# Patient Record
Sex: FEMALE | Race: WHITE | NOT HISPANIC OR LATINO | Employment: FULL TIME | URBAN - METROPOLITAN AREA
[De-identification: names, ages, dates, MRNs, and addresses within clinical notes are randomized per-mention and may not be internally consistent; named-entity substitution may affect disease eponyms.]

---

## 2024-09-10 ENCOUNTER — OFFICE VISIT (OUTPATIENT)
Dept: BEHAVIORAL HEALTH | Facility: CLINIC | Age: 49
End: 2024-09-10
Payer: COMMERCIAL

## 2024-09-10 DIAGNOSIS — F41.1 GENERALIZED ANXIETY DISORDER: Chronic | ICD-10-CM

## 2024-09-10 DIAGNOSIS — F33.42 RECURRENT MAJOR DEPRESSIVE DISORDER, IN FULL REMISSION (HCC): ICD-10-CM

## 2024-09-10 PROCEDURE — 99214 OFFICE O/P EST MOD 30 MIN: CPT | Performed by: PSYCHIATRY & NEUROLOGY

## 2024-09-10 NOTE — PROGRESS NOTES
PSYCHIATRY FOLLOW-UP NOTE      Name: Yomaira Rich  MRN: [id number]  : 1975  Age: 49 y.o.  Date of assessment: 9/10/2024  PCP: Parker Newton M.D.  Persons in attendance: Patient      REASON FOR VISIT/CHIEF COMPLAINT (as stated by Patient):  Yomaira Rich is a 49 y.o.,  or   White female, attending follow-up appointment for mood and anxiety management.      HISTORY OF PRESENT ILLNESS:  Yomaira Rich is a 49 y.o. old female with MDD, YINKA and OCD comes in today for follow up. Patient was last seen 2 months ago, and following treatment planning recommendations were done:  Increase Zoloft 250 mg daily for mood and anxiety management.     Continue Inderal 20 mg BID PRN for anxiety management.   Increase Trazodone 150 mg HS PRN for sleep.  Currently not in psychotherapy for mood & anxiety.    History of Present Illness    She is compliant with increasing Zoloft and trazodone with good response for mood, anxiety and sleep and denies any signs of serotonin syndrome.  She takes propranolol 20 mg twice daily consistently with second dose at bedtime.  Trazodone and gabapentin.  She does report occasional episodes of dizziness at the middle of the night.  Discussed that these are likely the additive hypotensive effect of taking 3 medications together.  She is currently on gabapentin 300 mg at bedtime.  Agreed with taking propranolol and 100 mg of gabapentin at dinnertime and taking the another 200 mg of gabapentin at bedtime with trazodone 150 mg.    Educated to monitor for signs of serotonin syndrome.  Agreed with plan of reducing Zoloft onto 200 mg after 6 months of stability.  Patient is showing improvement in mood and anxiety for the last 1 month.  Patient in agreement with reducing Zoloft to 200 mg early next year.      CURRENT MEDICATIONS:  Current Outpatient Medications   Medication Sig Dispense Refill    sertraline (ZOLOFT) 100 MG Tab Take 2.5 Tablets by mouth every  day for 90 days. 225 Tablet 0    propranolol (INDERAL) 20 MG Tab Take 1 Tablet by mouth 3 times a day as needed (Anxiety). 270 Tablet 1    traZODone (DESYREL) 150 MG Tab Take 1 Tablet by mouth every evening. 90 Tablet 1    Potassium 99 MG Tab Take 99 mg by mouth every day.      acetaminophen (TYLENOL) 500 MG Tab Take 1,000 mg by mouth every 6 hours as needed for Moderate Pain.      ibuprofen (MOTRIN) 200 MG Tab Take 200 mg by mouth every 8 hours as needed for Headache.      Triamcinolone Acetonide (KENALOG INJ) Inject 1 Dose as directed every 3 months.      atorvastatin (LIPITOR) 20 MG Tab Take 1 Tablet by mouth every day. 90 Tablet 3    hydroCHLOROthiazide 25 MG Tab Take 1 Tablet by mouth every day. (Patient taking differently: Take 25 mg by mouth every day.) 90 Tablet 3    levothyroxine (SYNTHROID) 50 MCG Tab Take 1 Tablet by mouth every morning on an empty stomach. 90 Tablet 3    NALTREXONE HCL PO Take 4.5 mg by mouth every day. COMPOUNDED MEDICATION 4.5 MG, ONCE DAILY  [facility]      Multiple Vitamin (MULTIVITAMIN PO) Take 1 Tablet by mouth every day.      DHEA 25 MG Tab Take 1 Tablet by mouth every day.      spironolactone (ALDACTONE) 100 MG Tab Take 100 mg by mouth every morning.      gabapentin (NEURONTIN) 100 MG Cap Take 400 mg by mouth every evening.  1     No current facility-administered medications for this visit.       MEDICAL HISTORY  Past Medical History:   Diagnosis Date    Arthritis 2018    Osteo    Bowel habit changes     constipation    Breath shortness     25 years ago    Bronchitis 11/2016    Diabetes (HCC) 12/08/2023    Borderline per patient    Disorder of thyroid     Erythrocytosis     Fibromyalgia     Gynecological disorder     pre cancer-vulva    High cholesterol     Hypertension     well controlled on medication per pt    Pain     Back, neck and shoulders.    Psychiatric problem     anxiety, depression    Under care of pain management specialist     MD Meeks in [place]      Past Surgical History:   Procedure Laterality Date    BIOPSY, VULVA, VAGINA, OR PERINEUM  4/18/2024    Procedure: WIDE LOCAL EXCISION OF LEFT VULVA;  Surgeon: Scott Blanco M.D.;  Location: [facility];  Service: Gynecology Oncology    ND CYSTOURETHROSCOPY N/A 12/26/2023    Procedure: CYSTOSCOPY;  Surgeon: Misty Kapoor D.O.;  Location: [facility];  Service: Gyn Robotic    HYSTERECTOMY ROBOTIC XI N/A 12/26/2023    Procedure: ROBOTIC HYSTERECTOMY, BILATERAL SALPINGO-OOPHORECTOMY;  Surgeon: Misty Kapoor D.O.;  Location: [facility];  Service: Gyn Robotic    DILATION AND CURETTAGE  05/19/2022    Procedure: DILATION AND CURETTAGE-ENDOCERVICAL;  Surgeon: Scott Blanco M.D.;  Location: [facility];  Service: Gynecology Oncology    BREAST BIOPSY Right 08/07/2019    Procedure: BIOPSY, BREAST - ASHLEY LOCALIZED;  Surgeon: Perla Mancilla M.D.;  Location: SURGERY SAME DAY [facility];  Service: General    MARY BY LAPAROSCOPY N/A 05/18/2018    Procedure: MARY BY LAPAROSCOPY;  Surgeon: Perla Mancilla M.D.;  Location: SURGERY SAME DAY [facility];  Service: General    PELVIC EXAM UNDER ANESTHESIA  06/01/2017    Procedure: PELVIC EXAM UNDER ANESTHESIA;  Surgeon: Jose Bruner M.D.;  Location: [facility];  Service:     VULVECTOMY PARTIAL  06/01/2017    Procedure: VULVECTOMY PARTIAL/WIDE LOCAL EXCISION;  Surgeon: Jose Bruner M.D.;  Location: [facility];  Service:     OTHER  2002    wisdom teeth removed    US-NEEDLE CORE BX-BREAST PANEL  1993    OTHER ABDOMINAL SURGERY  2019    Gall bladder removal       PAST PSYCHIATRIC MEDICATIONS  Prozac  Lexapro 20 mg daily for 3 years: Initially helpful but stopped working afterwards  Zoloft   Effexor for pain management (was not depressed during this trial)  Wellbutrin (for pain management per patient & not for depression)  Buspirone: GI side effects  Xanax 0.25 mg PRN      REVIEW OF SYSTEMS:         Constitutional negative   Eyes negative   Ears/Nose/Mouth/Throat negative   Cardiovascular negative   Respiratory negative   Gastrointestinal negative   Genitourinary negative   Muscular negative   Integumentary negative   Neurological negative   Endocrine negative   Hematologic/Lymphatic negative     PHYSICAL EXAMINAION:  Vital signs: LMP 2021 (Approximate)   Musculoskeletal: Normal gait.   Abnormal movements: none      MENTAL STATUS EXAMINATION      General:   - Grooming and hygiene: Casual,   - Apparent distress: none,   - Behavior: Calm  - Eye Contact:  Good,   - no psychomotor agitation or retardation    - Participation: Active verbal participation  Orientation: Alert and Fully Oriented to person, place and time  Mood: Euthymic  Affect: Flexible and Full range,  Thought Process: Logical and Goal-directed  Thought Content: Denies suicidal or homicidal ideations, intent or plan   Perception: Denies auditory or visual hallucinations. No delusions noted   Attention span and concentration: Intact   Speech:Rate within normal limits and Volume within normal limits  Language: Appropriate   Insight: Good  Judgment: Good  Recent and remote memory: No gross evidence of memory deficits        DEPRESSION SCREENIN/27/2023    10:00 AM 1/10/2024     8:00 AM 7/10/2024     8:30 AM   Depression Screen (PHQ-2/PHQ-9)   PHQ-2 Total Score 6 2 4   PHQ-9 Total Score 18 5 14       Interpretation of PHQ-9 Total Score   Score Severity   1-4 No Depression   5-9 Mild Depression   10-14 Moderate Depression   15-19 Moderately Severe Depression   20-27 Severe Depression    CURRENT RISK:       Suicidal: Low       Homicidal: Low       Self-Harm: Low       Relapse: Low       Crisis Safety Plan Reviewed Not Indicated       If evidence of imminent risk is present, intervention/plan:      MEDICAL RECORDS/LABS/DIAGNOSTIC TESTS REVIEWED:  No new lab since last visit   Results        [facility] records -   Reviewed     ASSESSMENT &  PLAN:  Assessment & Plan        PLAN:  (1) MDD, (2) YINKA, (3) OCD   improving  Continue Zoloft 250 mg daily for mood and anxiety management.     Continue Inderal 20 mg BID PRN for anxiety management.   Continue Trazodone 150 mg HS PRN for sleep.  Currently not in psychotherapy for mood & anxiety.  Medication options, alternatives (including no medications) and medication risks/benefits/side effects were discussed in detail.  Explained importance of contraceptive measures while on psychotropic medications, educated to let provider know if ever pregnant or wanting to become pregnant. Verbalized understanding.  The patient was advised to call, message provider on [employer], or come in to the clinic if symptoms worsen or if any future questions/issues regarding their medications arise; the patient verbalized understanding and agreement.    The patient was educated to call 911, call the suicide hotline, or go to local ER if having thoughts of suicide or homicide; verbalized understanding.      Billing Coding based on:  22588 based on MDM    Return to clinic in 3-4 months or sooner if symptoms worsen.  Next Appointment: instruction provided on how to make the next appointment.     The proposed treatment plan was discussed with the patient who was provided the opportunity to ask questions and make suggestions regarding alternative treatment. Patient verbalized understanding and expressed agreement with the plan.       Armando Choi M.D.  09/10/24    This note was created using voice recognition software (Dragon). The accuracy of the dictation is limited by the abilities of the software. I have reviewed the note prior to signing, however some errors in grammar and context are still possible. If you have any questions related to this note please do not hesitate to contact our office.

## 2024-10-10 DIAGNOSIS — F41.1 GENERALIZED ANXIETY DISORDER: Chronic | ICD-10-CM

## 2024-10-10 DIAGNOSIS — F42.2 MIXED OBSESSIONAL THOUGHTS AND ACTS: ICD-10-CM

## 2024-10-10 DIAGNOSIS — F33.42 RECURRENT MAJOR DEPRESSIVE DISORDER, IN FULL REMISSION (HCC): ICD-10-CM

## 2024-10-10 RX ORDER — SERTRALINE HYDROCHLORIDE 100 MG/1
250 TABLET, FILM COATED ORAL DAILY
Qty: 225 TABLET | Refills: 1 | Status: SHIPPED | OUTPATIENT
Start: 2024-10-10 | End: 2025-01-08

## 2024-12-02 ENCOUNTER — PATIENT MESSAGE (OUTPATIENT)
Dept: MEDICAL GROUP | Facility: PHYSICIAN GROUP | Age: 49
End: 2024-12-02
Payer: COMMERCIAL

## 2024-12-03 DIAGNOSIS — E03.9 HYPOTHYROIDISM, UNSPECIFIED TYPE: Chronic | ICD-10-CM

## 2024-12-03 RX ORDER — ATORVASTATIN CALCIUM 20 MG/1
20 TABLET, FILM COATED ORAL DAILY
Qty: 90 TABLET | Refills: 0 | Status: SHIPPED | OUTPATIENT
Start: 2024-12-03

## 2024-12-03 RX ORDER — HYDROCHLOROTHIAZIDE 25 MG/1
25 TABLET ORAL
Qty: 90 TABLET | Refills: 0 | Status: SHIPPED | OUTPATIENT
Start: 2024-12-03

## 2024-12-03 RX ORDER — LEVOTHYROXINE SODIUM 50 UG/1
50 TABLET ORAL
Qty: 90 TABLET | Refills: 0 | Status: SHIPPED | OUTPATIENT
Start: 2024-12-03

## 2024-12-03 NOTE — TELEPHONE ENCOUNTER
Received request via: Patient    Was the patient seen in the last year in this department? No    Does the patient have an active prescription (recently filled or refills available) for medication(s) requested? No        Does the patient have [facility] and need 100-day supply? (This applies to ALL medications) Patient does not have [facility]    Appointment 1/15/2025

## 2024-12-04 ENCOUNTER — APPOINTMENT (OUTPATIENT)
Dept: URBAN - METROPOLITAN AREA CLINIC 4 | Facility: CLINIC | Age: 49
Setting detail: DERMATOLOGY
End: 2024-12-04

## 2024-12-04 DIAGNOSIS — L82.0 INFLAMED SEBORRHEIC KERATOSIS: ICD-10-CM

## 2024-12-04 DIAGNOSIS — L81.4 OTHER MELANIN HYPERPIGMENTATION: ICD-10-CM

## 2024-12-04 DIAGNOSIS — L82.1 OTHER SEBORRHEIC KERATOSIS: ICD-10-CM

## 2024-12-04 DIAGNOSIS — L70.0 ACNE VULGARIS: ICD-10-CM | Status: IMPROVED

## 2024-12-04 DIAGNOSIS — D22 MELANOCYTIC NEVI: ICD-10-CM | Status: STABLE

## 2024-12-04 PROBLEM — D22.62 MELANOCYTIC NEVI OF LEFT UPPER LIMB, INCLUDING SHOULDER: Status: ACTIVE | Noted: 2024-12-04

## 2024-12-04 PROBLEM — D22.5 MELANOCYTIC NEVI OF TRUNK: Status: ACTIVE | Noted: 2024-12-04

## 2024-12-04 PROBLEM — D22.71 MELANOCYTIC NEVI OF RIGHT LOWER LIMB, INCLUDING HIP: Status: ACTIVE | Noted: 2024-12-04

## 2024-12-04 PROBLEM — D22.39 MELANOCYTIC NEVI OF OTHER PARTS OF FACE: Status: ACTIVE | Noted: 2024-12-04

## 2024-12-04 PROCEDURE — ? COUNSELING

## 2024-12-04 PROCEDURE — 99213 OFFICE O/P EST LOW 20 MIN: CPT | Mod: 25

## 2024-12-04 PROCEDURE — ? OBSERVATION AND MEASURE

## 2024-12-04 PROCEDURE — 17110 DESTRUCTION B9 LES UP TO 14: CPT

## 2024-12-04 PROCEDURE — ? LIQUID NITROGEN

## 2024-12-04 PROCEDURE — ? ADDITIONAL NOTES

## 2024-12-04 PROCEDURE — ? PRESCRIPTION

## 2024-12-04 PROCEDURE — ? TREATMENT REGIMEN

## 2024-12-04 RX ORDER — SPIRONOLACTONE 100 MG/1
1 TABLET, FILM COATED ORAL QD
Qty: 90 | Refills: 3 | Status: ERX

## 2024-12-04 ASSESSMENT — LOCATION ZONE DERM
LOCATION ZONE: FACE
LOCATION ZONE: LEG
LOCATION ZONE: ARM
LOCATION ZONE: SCALP
LOCATION ZONE: TRUNK

## 2024-12-04 ASSESSMENT — LOCATION DETAILED DESCRIPTION DERM
LOCATION DETAILED: LEFT LATERAL FOREHEAD
LOCATION DETAILED: LEFT SUPERIOR CENTRAL MALAR CHEEK
LOCATION DETAILED: RIGHT POPLITEAL SKIN
LOCATION DETAILED: RIGHT MEDIAL BREAST 3-4:00 REGION
LOCATION DETAILED: LEFT PROXIMAL DORSAL FOREARM
LOCATION DETAILED: RIGHT RIB CAGE
LOCATION DETAILED: RIGHT CHIN
LOCATION DETAILED: LEFT INFERIOR LATERAL MIDBACK
LOCATION DETAILED: LEFT INFERIOR MEDIAL MIDBACK
LOCATION DETAILED: RIGHT SUPERIOR UPPER BACK
LOCATION DETAILED: MID-FRONTAL SCALP
LOCATION DETAILED: LEFT INFERIOR LATERAL LOWER BACK
LOCATION DETAILED: RIGHT INFERIOR LATERAL UPPER BACK
LOCATION DETAILED: RIGHT MEDIAL UPPER BACK

## 2024-12-04 ASSESSMENT — LOCATION SIMPLE DESCRIPTION DERM
LOCATION SIMPLE: RIGHT UPPER BACK
LOCATION SIMPLE: LEFT CHEEK
LOCATION SIMPLE: RIGHT BREAST
LOCATION SIMPLE: ABDOMEN
LOCATION SIMPLE: LEFT FOREARM
LOCATION SIMPLE: RIGHT POPLITEAL SKIN
LOCATION SIMPLE: LEFT FOREHEAD
LOCATION SIMPLE: ANTERIOR SCALP
LOCATION SIMPLE: CHIN
LOCATION SIMPLE: LEFT LOWER BACK

## 2024-12-04 NOTE — PROCEDURE: OBSERVATION
Size Of Lesion: 4mm
Detail Level: Detailed
Morphology Per Location (Optional): centrally dark papule
Size Of Lesion: 3mm
Morphology Per Location (Optional): dark macule
Morphology Per Location (Optional): heart-shaped macule
Size Of Lesion: 5mm
Morphology Per Location (Optional): dark papule in scar
Size Of Lesion: 2mm
Size Of Lesion: 1mm
Morphology Per Location (Optional): reddish brown papule
Morphology Per Location (Optional): papule
Size Of Lesion: 8mm

## 2024-12-04 NOTE — PROCEDURE: LIQUID NITROGEN
Add 52 Modifier (Optional): no
Include Z78.9 (Other Specified Conditions Influencing Health Status) As An Associated Diagnosis?: Yes
Consent: The patient's verbal consent was obtained including but not limited to risks of crusting, scabbing, blistering, scarring, darker or lighter pigmentary change, recurrence, incomplete removal and infection.
Medical Necessity Clause: This procedure was medically necessary because the lesions that were treated were:
Detail Level: Detailed
Spray Paint Text: The liquid nitrogen was applied to the skin utilizing a spray paint frosting technique.
Medical Necessity Information: It is in your best interest to select a reason for this procedure from the list below. All of these items fulfill various CMS LCD requirements except the new and changing color options.
Post-Care Instructions: I reviewed with the patient in detail post-care instructions. Patient is to wear sunprotection, and avoid picking at any of the treated lesions. Pt may apply Vaseline to crusted or scabbing areas.

## 2024-12-04 NOTE — PROCEDURE: COUNSELING
Detail Level: Detailed
Detail Level: Generalized
Birth Control Pills Pregnancy And Lactation Text: This medication should be avoided if pregnant and for the first 30 days post-partum.
High Dose Vitamin A Pregnancy And Lactation Text: High dose vitamin A therapy is contraindicated during pregnancy and breast feeding.
Spironolactone Counseling: Patient advised regarding risks of diarrhea, abdominal pain, hyperkalemia, birth defects (for female patients), liver toxicity and renal toxicity. The patient may need blood work to monitor liver and kidney function and potassium levels while on therapy. The patient verbalized understanding of the proper use and possible adverse effects of spironolactone.  All of the patient's questions and concerns were addressed.
Birth Control Pills Counseling: Birth Control Pill Counseling: I discussed with the patient the potential side effects of OCPs including but not limited to increased risk of stroke, heart attack, thrombophlebitis, deep venous thrombosis, hepatic adenomas, breast changes, GI upset, headaches, and depression.  The patient verbalized understanding of the proper use and possible adverse effects of OCPs. All of the patient's questions and concerns were addressed.
Erythromycin Counseling:  I discussed with the patient the risks of erythromycin including but not limited to GI upset, allergic reaction, drug rash, diarrhea, increase in liver enzymes, and yeast infections.
Isotretinoin Counseling: Patient should get monthly blood tests, not donate blood, not drive at night if vision affected, not share medication, and not undergo elective surgery for 6 months after tx completed. Side effects reviewed, pt to contact office should one occur.
Patient Specific Counseling (Will Not Stick From Patient To Patient): Pt tolerating spiranolactone and is satisfied with results.  Recommend continuing current dosage.  Let primary MD know she is taking when she gets lab work done or starts any new meds.
Spironolactone Pregnancy And Lactation Text: This medication can cause feminization of the male fetus and should be avoided during pregnancy. The active metabolite is also found in breast milk.
Dapsone Counseling: I discussed with the patient the risks of dapsone including but not limited to hemolytic anemia, agranulocytosis, rashes, methemoglobinemia, kidney failure, peripheral neuropathy, headaches, GI upset, and liver toxicity.  Patients who start dapsone require monitoring including baseline LFTs and weekly CBCs for the first month, then every month thereafter.  The patient verbalized understanding of the proper use and possible adverse effects of dapsone.  All of the patient's questions and concerns were addressed.
Isotretinoin Pregnancy And Lactation Text: This medication is Pregnancy Category X and is considered extremely dangerous during pregnancy. It is unknown if it is excreted in breast milk.
Doxycycline Pregnancy And Lactation Text: This medication is Pregnancy Category D and not consider safe during pregnancy. It is also excreted in breast milk but is considered safe for shorter treatment courses.
Tetracycline Counseling: Patient counseled regarding possible photosensitivity and increased risk for sunburn.  Patient instructed to avoid sunlight, if possible.  When exposed to sunlight, patients should wear protective clothing, sunglasses, and sunscreen.  The patient was instructed to call the office immediately if the following severe adverse effects occur:  hearing changes, easy bruising/bleeding, severe headache, or vision changes.  The patient verbalized understanding of the proper use and possible adverse effects of tetracycline.  All of the patient's questions and concerns were addressed. Patient understands to avoid pregnancy while on therapy due to potential birth defects.
Tazorac Pregnancy And Lactation Text: This medication is not safe during pregnancy. It is unknown if this medication is excreted in breast milk.
Bactrim Pregnancy And Lactation Text: This medication is Pregnancy Category D and is known to cause fetal risk.  It is also excreted in breast milk.
Topical Clindamycin Pregnancy And Lactation Text: This medication is Pregnancy Category B and is considered safe during pregnancy. It is unknown if it is excreted in breast milk.
Topical Retinoid Pregnancy And Lactation Text: This medication is Pregnancy Category C. It is unknown if this medication is excreted in breast milk.
Topical Clindamycin Counseling: Patient counseled that this medication may cause skin irritation or allergic reactions.  In the event of skin irritation, the patient was advised to reduce the amount of the drug applied or use it less frequently.   The patient verbalized understanding of the proper use and possible adverse effects of clindamycin.  All of the patient's questions and concerns were addressed.
Benzoyl Peroxide Counseling: Patient counseled that medicine may cause skin irritation and bleach clothing.  In the event of skin irritation, the patient was advised to reduce the amount of the drug applied or use it less frequently.   The patient verbalized understanding of the proper use and possible adverse effects of benzoyl peroxide.  All of the patient's questions and concerns were addressed.
Minocycline Counseling: Patient advised regarding possible photosensitivity and discoloration of the teeth, skin, lips, tongue and gums.  Patient instructed to avoid sunlight, if possible.  When exposed to sunlight, patients should wear protective clothing, sunglasses, and sunscreen.  The patient was instructed to call the office immediately if the following severe adverse effects occur:  hearing changes, easy bruising/bleeding, severe headache, or vision changes.  The patient verbalized understanding of the proper use and possible adverse effects of minocycline.  All of the patient's questions and concerns were addressed.
Topical Sulfur Applications Pregnancy And Lactation Text: This medication is Pregnancy Category C and has an unknown safety profile during pregnancy. It is unknown if this topical medication is excreted in breast milk.
Azithromycin Pregnancy And Lactation Text: This medication is considered safe during pregnancy and is also secreted in breast milk.
Benzoyl Peroxide Pregnancy And Lactation Text: This medication is Pregnancy Category C. It is unknown if benzoyl peroxide is excreted in breast milk.
Azithromycin Counseling:  I discussed with the patient the risks of azithromycin including but not limited to GI upset, allergic reaction, drug rash, diarrhea, and yeast infections.
Erythromycin Pregnancy And Lactation Text: This medication is Pregnancy Category B and is considered safe during pregnancy. It is also excreted in breast milk.
Tetracycline Pregnancy And Lactation Text: This medication is Pregnancy Category D and not consider safe during pregnancy. It is also excreted in breast milk.
Topical Sulfur Applications Counseling: Topical Sulfur Counseling: Patient counseled that this medication may cause skin irritation or allergic reactions.  In the event of skin irritation, the patient was advised to reduce the amount of the drug applied or use it less frequently.   The patient verbalized understanding of the proper use and possible adverse effects of topical sulfur application.  All of the patient's questions and concerns were addressed.
Bactrim Counseling:  I discussed with the patient the risks of sulfa antibiotics including but not limited to GI upset, allergic reaction, drug rash, diarrhea, dizziness, photosensitivity, and yeast infections.  Rarely, more serious reactions can occur including but not limited to aplastic anemia, agranulocytosis, methemoglobinemia, blood dyscrasias, liver or kidney failure, lung infiltrates or desquamative/blistering drug rashes.
Topical Retinoid counseling:  Patient advised to apply a pea-sized amount only at bedtime and wait 30 minutes after washing their face before applying.  If too drying, patient may add a non-comedogenic moisturizer. The patient verbalized understanding of the proper use and possible adverse effects of retinoids.  All of the patient's questions and concerns were addressed.
Dapsone Pregnancy And Lactation Text: This medication is Pregnancy Category C and is not considered safe during pregnancy or breast feeding.
Doxycycline Counseling:  Patient counseled regarding possible photosensitivity and increased risk for sunburn.  Patient instructed to avoid sunlight, if possible.  When exposed to sunlight, patients should wear protective clothing, sunglasses, and sunscreen.  The patient was instructed to call the office immediately if the following severe adverse effects occur:  hearing changes, easy bruising/bleeding, severe headache, or vision changes.  The patient verbalized understanding of the proper use and possible adverse effects of doxycycline.  All of the patient's questions and concerns were addressed.
Tazorac Counseling:  Patient advised that medication is irritating and drying.  Patient may need to apply sparingly and wash off after an hour before eventually leaving it on overnight.  The patient verbalized understanding of the proper use and possible adverse effects of tazorac.  All of the patient's questions and concerns were addressed.
High Dose Vitamin A Counseling: Side effects reviewed, pt to contact office should one occur.
Sarecycline Counseling: Patient advised regarding possible photosensitivity and discoloration of the teeth, skin, lips, tongue and gums.  Patient instructed to avoid sunlight, if possible.  When exposed to sunlight, patients should wear protective clothing, sunglasses, and sunscreen.  The patient was instructed to call the office immediately if the following severe adverse effects occur:  hearing changes, easy bruising/bleeding, severe headache, or vision changes.  The patient verbalized understanding of the proper use and possible adverse effects of sarecycline.  All of the patient's questions and concerns were addressed.

## 2024-12-04 NOTE — PROCEDURE: TREATMENT REGIMEN
Detail Level: Simple
Continue Regimen: - spironolactone 100 mg tablet QD\\nSig: Take one tablet daily for acne

## 2024-12-04 NOTE — PROCEDURE: ADDITIONAL NOTES
Additional Notes: Patient using spironolactone 100 mg tablet with positive improvement. Patient states that she has been getting blood drawn periodically with occasionally low potassium.  Also on HCTZ.  Primary is monitoring as needed.  Will refill today.
Detail Level: Detailed
Render Risk Assessment In Note?: no
Additional Notes: Pt states one on cheek occasionally irritated, and requests trial of cryo.

## 2025-01-12 SDOH — ECONOMIC STABILITY: INCOME INSECURITY: IN THE LAST 12 MONTHS, WAS THERE A TIME WHEN YOU WERE NOT ABLE TO PAY THE MORTGAGE OR RENT ON TIME?: NO

## 2025-01-12 SDOH — ECONOMIC STABILITY: TRANSPORTATION INSECURITY
IN THE PAST 12 MONTHS, HAS LACK OF TRANSPORTATION KEPT YOU FROM MEETINGS, WORK, OR FROM GETTING THINGS NEEDED FOR DAILY LIVING?: NO

## 2025-01-12 SDOH — HEALTH STABILITY: PHYSICAL HEALTH: ON AVERAGE, HOW MANY DAYS PER WEEK DO YOU ENGAGE IN MODERATE TO STRENUOUS EXERCISE (LIKE A BRISK WALK)?: 3 DAYS

## 2025-01-12 SDOH — ECONOMIC STABILITY: TRANSPORTATION INSECURITY
IN THE PAST 12 MONTHS, HAS LACK OF RELIABLE TRANSPORTATION KEPT YOU FROM MEDICAL APPOINTMENTS, MEETINGS, WORK OR FROM GETTING THINGS NEEDED FOR DAILY LIVING?: NO

## 2025-01-12 SDOH — ECONOMIC STABILITY: INCOME INSECURITY: HOW HARD IS IT FOR YOU TO PAY FOR THE VERY BASICS LIKE FOOD, HOUSING, MEDICAL CARE, AND HEATING?: NOT HARD AT ALL

## 2025-01-12 SDOH — ECONOMIC STABILITY: FOOD INSECURITY: WITHIN THE PAST 12 MONTHS, YOU WORRIED THAT YOUR FOOD WOULD RUN OUT BEFORE YOU GOT MONEY TO BUY MORE.: NEVER TRUE

## 2025-01-12 SDOH — HEALTH STABILITY: PHYSICAL HEALTH: ON AVERAGE, HOW MANY MINUTES DO YOU ENGAGE IN EXERCISE AT THIS LEVEL?: 30 MIN

## 2025-01-12 SDOH — ECONOMIC STABILITY: FOOD INSECURITY: WITHIN THE PAST 12 MONTHS, THE FOOD YOU BOUGHT JUST DIDN'T LAST AND YOU DIDN'T HAVE MONEY TO GET MORE.: NEVER TRUE

## 2025-01-12 SDOH — ECONOMIC STABILITY: TRANSPORTATION INSECURITY
IN THE PAST 12 MONTHS, HAS THE LACK OF TRANSPORTATION KEPT YOU FROM MEDICAL APPOINTMENTS OR FROM GETTING MEDICATIONS?: NO

## 2025-01-12 SDOH — ECONOMIC STABILITY: HOUSING INSECURITY
IN THE LAST 12 MONTHS, WAS THERE A TIME WHEN YOU DID NOT HAVE A STEADY PLACE TO SLEEP OR SLEPT IN A SHELTER (INCLUDING NOW)?: NO

## 2025-01-12 SDOH — HEALTH STABILITY: MENTAL HEALTH
STRESS IS WHEN SOMEONE FEELS TENSE, NERVOUS, ANXIOUS, OR CAN'T SLEEP AT NIGHT BECAUSE THEIR MIND IS TROUBLED. HOW STRESSED ARE YOU?: RATHER MUCH

## 2025-01-12 ASSESSMENT — SOCIAL DETERMINANTS OF HEALTH (SDOH)
DO YOU BELONG TO ANY CLUBS OR ORGANIZATIONS SUCH AS CHURCH GROUPS UNIONS, FRATERNAL OR ATHLETIC GROUPS, OR SCHOOL GROUPS?: YES
IN A TYPICAL WEEK, HOW MANY TIMES DO YOU TALK ON THE PHONE WITH FAMILY, FRIENDS, OR NEIGHBORS?: MORE THAN THREE TIMES A WEEK
IN A TYPICAL WEEK, HOW MANY TIMES DO YOU TALK ON THE PHONE WITH FAMILY, FRIENDS, OR NEIGHBORS?: MORE THAN THREE TIMES A WEEK
HOW OFTEN DO YOU HAVE SIX OR MORE DRINKS ON ONE OCCASION: NEVER
HOW OFTEN DO YOU ATTEND CHURCH OR RELIGIOUS SERVICES?: NEVER
HOW OFTEN DO YOU HAVE A DRINK CONTAINING ALCOHOL: NEVER
HOW OFTEN DO YOU GET TOGETHER WITH FRIENDS OR RELATIVES?: ONCE A WEEK
DO YOU BELONG TO ANY CLUBS OR ORGANIZATIONS SUCH AS CHURCH GROUPS UNIONS, FRATERNAL OR ATHLETIC GROUPS, OR SCHOOL GROUPS?: YES
HOW OFTEN DO YOU ATTENT MEETINGS OF THE CLUB OR ORGANIZATION YOU BELONG TO?: MORE THAN 4 TIMES PER YEAR
HOW HARD IS IT FOR YOU TO PAY FOR THE VERY BASICS LIKE FOOD, HOUSING, MEDICAL CARE, AND HEATING?: NOT HARD AT ALL
HOW MANY DRINKS CONTAINING ALCOHOL DO YOU HAVE ON A TYPICAL DAY WHEN YOU ARE DRINKING: PATIENT DOES NOT DRINK
IN THE PAST 12 MONTHS, HAS THE ELECTRIC, GAS, OIL, OR WATER COMPANY THREATENED TO SHUT OFF SERVICE IN YOUR HOME?: NO
HOW OFTEN DO YOU GET TOGETHER WITH FRIENDS OR RELATIVES?: ONCE A WEEK
HOW OFTEN DO YOU ATTEND CHURCH OR RELIGIOUS SERVICES?: NEVER
HOW OFTEN DO YOU ATTENT MEETINGS OF THE CLUB OR ORGANIZATION YOU BELONG TO?: MORE THAN 4 TIMES PER YEAR
WITHIN THE PAST 12 MONTHS, YOU WORRIED THAT YOUR FOOD WOULD RUN OUT BEFORE YOU GOT THE MONEY TO BUY MORE: NEVER TRUE

## 2025-01-12 ASSESSMENT — LIFESTYLE VARIABLES
HOW MANY STANDARD DRINKS CONTAINING ALCOHOL DO YOU HAVE ON A TYPICAL DAY: PATIENT DOES NOT DRINK
HOW OFTEN DO YOU HAVE SIX OR MORE DRINKS ON ONE OCCASION: NEVER
HOW OFTEN DO YOU HAVE A DRINK CONTAINING ALCOHOL: NEVER
SKIP TO QUESTIONS 9-10: 1
AUDIT-C TOTAL SCORE: 0

## 2025-01-15 ENCOUNTER — APPOINTMENT (OUTPATIENT)
Dept: MEDICAL GROUP | Facility: PHYSICIAN GROUP | Age: 50
End: 2025-01-15
Payer: COMMERCIAL

## 2025-01-15 ENCOUNTER — HOSPITAL ENCOUNTER (OUTPATIENT)
Dept: LAB | Facility: MEDICAL CENTER | Age: 50
End: 2025-01-15
Attending: INTERNAL MEDICINE
Payer: COMMERCIAL

## 2025-01-15 VITALS
HEART RATE: 61 BPM | OXYGEN SATURATION: 96 % | BODY MASS INDEX: 36.16 KG/M2 | HEIGHT: 64 IN | WEIGHT: 211.8 LBS | TEMPERATURE: 96.9 F | SYSTOLIC BLOOD PRESSURE: 116 MMHG | DIASTOLIC BLOOD PRESSURE: 72 MMHG

## 2025-01-15 DIAGNOSIS — R92.30 DENSE BREAST: ICD-10-CM

## 2025-01-15 DIAGNOSIS — F33.42 RECURRENT MAJOR DEPRESSIVE DISORDER, IN FULL REMISSION (HCC): Chronic | ICD-10-CM

## 2025-01-15 DIAGNOSIS — Z23 NEED FOR VACCINATION: ICD-10-CM

## 2025-01-15 DIAGNOSIS — F51.01 PRIMARY INSOMNIA: ICD-10-CM

## 2025-01-15 DIAGNOSIS — E03.9 HYPOTHYROIDISM, UNSPECIFIED TYPE: Chronic | ICD-10-CM

## 2025-01-15 DIAGNOSIS — Z00.00 WELL ADULT EXAM: ICD-10-CM

## 2025-01-15 DIAGNOSIS — F41.1 GENERALIZED ANXIETY DISORDER: Chronic | ICD-10-CM

## 2025-01-15 DIAGNOSIS — I10 ESSENTIAL HYPERTENSION: ICD-10-CM

## 2025-01-15 DIAGNOSIS — Z90.710 STATUS POST HYSTERECTOMY: ICD-10-CM

## 2025-01-15 DIAGNOSIS — Z12.31 ENCOUNTER FOR SCREENING MAMMOGRAM FOR MALIGNANT NEOPLASM OF BREAST: ICD-10-CM

## 2025-01-15 DIAGNOSIS — M79.7 FIBROMYALGIA: ICD-10-CM

## 2025-01-15 DIAGNOSIS — E78.5 DYSLIPIDEMIA: Chronic | ICD-10-CM

## 2025-01-15 DIAGNOSIS — Z85.44 HISTORY OF CANCER OF VULVA: ICD-10-CM

## 2025-01-15 LAB
25(OH)D3 SERPL-MCNC: 41 NG/ML (ref 30–100)
ALT SERPL-CCNC: 29 U/L (ref 2–50)
ANION GAP SERPL CALC-SCNC: 13 MMOL/L (ref 7–16)
BASOPHILS # BLD AUTO: 0.7 % (ref 0–1.8)
BASOPHILS # BLD: 0.05 K/UL (ref 0–0.12)
BUN SERPL-MCNC: 11 MG/DL (ref 8–22)
CALCIUM SERPL-MCNC: 9.4 MG/DL (ref 8.5–10.5)
CHLORIDE SERPL-SCNC: 97 MMOL/L (ref 96–112)
CHOLEST SERPL-MCNC: 168 MG/DL (ref 100–199)
CO2 SERPL-SCNC: 28 MMOL/L (ref 20–33)
CREAT SERPL-MCNC: 0.71 MG/DL (ref 0.5–1.4)
CREAT UR-MCNC: 151.21 MG/DL
EOSINOPHIL # BLD AUTO: 0.18 K/UL (ref 0–0.51)
EOSINOPHIL NFR BLD: 2.4 % (ref 0–6.9)
ERYTHROCYTE [DISTWIDTH] IN BLOOD BY AUTOMATED COUNT: 43.4 FL (ref 35.9–50)
EST. AVERAGE GLUCOSE BLD GHB EST-MCNC: 117 MG/DL
GFR SERPLBLD CREATININE-BSD FMLA CKD-EPI: 104 ML/MIN/1.73 M 2
GLUCOSE SERPL-MCNC: 89 MG/DL (ref 65–99)
HBA1C MFR BLD: 5.7 % (ref 4–5.6)
HCT VFR BLD AUTO: 45.5 % (ref 37–47)
HDLC SERPL-MCNC: 69 MG/DL
HGB BLD-MCNC: 15.1 G/DL (ref 12–16)
IMM GRANULOCYTES # BLD AUTO: 0.02 K/UL (ref 0–0.11)
IMM GRANULOCYTES NFR BLD AUTO: 0.3 % (ref 0–0.9)
LDLC SERPL CALC-MCNC: 70 MG/DL
LYMPHOCYTES # BLD AUTO: 2.42 K/UL (ref 1–4.8)
LYMPHOCYTES NFR BLD: 32.6 % (ref 22–41)
MCH RBC QN AUTO: 29.2 PG (ref 27–33)
MCHC RBC AUTO-ENTMCNC: 33.2 G/DL (ref 32.2–35.5)
MCV RBC AUTO: 87.8 FL (ref 81.4–97.8)
MICROALBUMIN UR-MCNC: <1.2 MG/DL
MICROALBUMIN/CREAT UR: NORMAL MG/G (ref 0–30)
MONOCYTES # BLD AUTO: 0.66 K/UL (ref 0–0.85)
MONOCYTES NFR BLD AUTO: 8.9 % (ref 0–13.4)
NEUTROPHILS # BLD AUTO: 4.1 K/UL (ref 1.82–7.42)
NEUTROPHILS NFR BLD: 55.1 % (ref 44–72)
NRBC # BLD AUTO: 0 K/UL
NRBC BLD-RTO: 0 /100 WBC (ref 0–0.2)
PLATELET # BLD AUTO: 361 K/UL (ref 164–446)
PMV BLD AUTO: 9.1 FL (ref 9–12.9)
POTASSIUM SERPL-SCNC: 3.7 MMOL/L (ref 3.6–5.5)
RBC # BLD AUTO: 5.18 M/UL (ref 4.2–5.4)
SODIUM SERPL-SCNC: 138 MMOL/L (ref 135–145)
TRIGL SERPL-MCNC: 144 MG/DL (ref 0–149)
TSH SERPL-ACNC: 3.01 UIU/ML (ref 0.35–5.5)
WBC # BLD AUTO: 7.4 K/UL (ref 4.8–10.8)

## 2025-01-15 PROCEDURE — 90471 IMMUNIZATION ADMIN: CPT | Performed by: INTERNAL MEDICINE

## 2025-01-15 PROCEDURE — 36415 COLL VENOUS BLD VENIPUNCTURE: CPT

## 2025-01-15 PROCEDURE — 80061 LIPID PANEL: CPT

## 2025-01-15 PROCEDURE — 82306 VITAMIN D 25 HYDROXY: CPT

## 2025-01-15 PROCEDURE — 85025 COMPLETE CBC W/AUTO DIFF WBC: CPT

## 2025-01-15 PROCEDURE — 82043 UR ALBUMIN QUANTITATIVE: CPT

## 2025-01-15 PROCEDURE — 99386 PREV VISIT NEW AGE 40-64: CPT | Mod: 25 | Performed by: INTERNAL MEDICINE

## 2025-01-15 PROCEDURE — 83036 HEMOGLOBIN GLYCOSYLATED A1C: CPT

## 2025-01-15 PROCEDURE — 3074F SYST BP LT 130 MM HG: CPT | Performed by: INTERNAL MEDICINE

## 2025-01-15 PROCEDURE — 84443 ASSAY THYROID STIM HORMONE: CPT

## 2025-01-15 PROCEDURE — 90715 TDAP VACCINE 7 YRS/> IM: CPT | Performed by: INTERNAL MEDICINE

## 2025-01-15 PROCEDURE — 3078F DIAST BP <80 MM HG: CPT | Performed by: INTERNAL MEDICINE

## 2025-01-15 PROCEDURE — 82570 ASSAY OF URINE CREATININE: CPT

## 2025-01-15 PROCEDURE — 80048 BASIC METABOLIC PNL TOTAL CA: CPT

## 2025-01-15 PROCEDURE — 84460 ALANINE AMINO (ALT) (SGPT): CPT

## 2025-01-15 RX ORDER — LEVOTHYROXINE SODIUM 50 UG/1
50 TABLET ORAL
Qty: 90 TABLET | Refills: 3 | Status: SHIPPED | OUTPATIENT
Start: 2025-01-15

## 2025-01-15 RX ORDER — SERTRALINE HYDROCHLORIDE 100 MG/1
200 TABLET, FILM COATED ORAL DAILY
COMMUNITY
End: 2025-01-29 | Stop reason: SDUPTHER

## 2025-01-15 RX ORDER — ATORVASTATIN CALCIUM 20 MG/1
20 TABLET, FILM COATED ORAL DAILY
Qty: 90 TABLET | Refills: 3 | Status: SHIPPED | OUTPATIENT
Start: 2025-01-15

## 2025-01-15 RX ORDER — HYDROCHLOROTHIAZIDE 25 MG/1
25 TABLET ORAL
Qty: 90 TABLET | Refills: 3 | Status: SHIPPED | OUTPATIENT
Start: 2025-01-15

## 2025-01-15 ASSESSMENT — FIBROSIS 4 INDEX: FIB4 SCORE: 0.4

## 2025-01-15 ASSESSMENT — PATIENT HEALTH QUESTIONNAIRE - PHQ9: CLINICAL INTERPRETATION OF PHQ2 SCORE: 0

## 2025-01-15 NOTE — ASSESSMENT & PLAN NOTE
Chronic condition.  Patient status post hysterectomy and bilateral oophorectomy December 2023.  Patient presently followed by GYN oncologist Dr. Blanco.

## 2025-01-15 NOTE — ASSESSMENT & PLAN NOTE
Chronic condition.  The patient currently on diet therapy.  Patient is due for lab test.  No new symptoms reported.

## 2025-01-15 NOTE — ASSESSMENT & PLAN NOTE
Chronic condition.  Patient presently takes hydrochlorothiazide.  Blood pressure has been well-controlled.  No significant side effects reported.

## 2025-01-15 NOTE — ASSESSMENT & PLAN NOTE
Chronic condition.  Also followed by psychiatry service.  Patient currently taking propranolol 20 Mg 3 times a day as needed.  No new symptoms reported.  The patient tolerating medication well.

## 2025-01-15 NOTE — ASSESSMENT & PLAN NOTE
Chronic condition.  Patient presently followed by psychiatry service.   Patient denies SI. Pt currently taking zoloft

## 2025-01-15 NOTE — ASSESSMENT & PLAN NOTE
Chronic condition.  Patient followed by pain specialist.  She is currently taking gabapentin and naltrexone.  Patient tolerating the medication well.  Patient also receiving trigger point injection every 3 months.

## 2025-01-15 NOTE — ASSESSMENT & PLAN NOTE
This is a chronic condition.  Followed by psychiatry service.  The patient is taking trazodone nightly as needed.  Patient Toller medication well.

## 2025-01-15 NOTE — PROGRESS NOTES
PRIMARY CARE CLINIC VISIT    Chief complaint:    Pt is here for Annual Exam      History of Present Illness     Recurrent major depressive disorder, in full remission (HCC)  Chronic condition.  Patient presently followed by psychiatry service.   Patient denies SI. Pt currently taking zoloft    Generalized anxiety disorder  Chronic condition.  Also followed by psychiatry service.  Patient currently taking propranolol 20 Mg 3 times a day as needed.  No new symptoms reported.  The patient tolerating medication well.    Primary insomnia  This is a chronic condition.  Followed by psychiatry service.  The patient is taking trazodone nightly as needed.  Patient Toller medication well.    Hypothyroidism  Chronic condition.  Presently taking levothyroxine.  Patient is due for lab test.    Dyslipidemia  Chronic condition.  The patient currently on diet therapy.  Patient is due for lab test.  No new symptoms reported.    History of cancer of vulva  Chronic condition.  Patient status post hysterectomy and bilateral oophorectomy December 2023.  Patient presently followed by GYN oncologist Dr. Blanco.    Fibromyalgia  Chronic condition.  Patient followed by pain specialist.  She is currently taking gabapentin and naltrexone.  Patient tolerating the medication well.  Patient also receiving trigger point injection every 3 months.    Essential hypertension  Chronic condition.  Patient presently takes hydrochlorothiazide.  Blood pressure has been well-controlled.  No significant side effects reported.      Allergies: Patient has no known allergies.    Current Outpatient Medications Ordered in Epic   Medication Sig Dispense Refill    atorvastatin (LIPITOR) 20 MG Tab Take 1 Tablet by mouth every day. 90 Tablet 3    hydroCHLOROthiazide 25 MG Tab Take 1 Tablet by mouth every day. 90 Tablet 3    levothyroxine (SYNTHROID) 50 MCG Tab Take 1 Tablet by mouth every morning on an empty stomach. 90 Tablet 3    propranolol (INDERAL) 20 MG Tab Take 1  Tablet by mouth 3 times a day as needed (Anxiety). 270 Tablet 1    traZODone (DESYREL) 150 MG Tab Take 1 Tablet by mouth every evening. 90 Tablet 1    Triamcinolone Acetonide (KENALOG INJ) Inject 1 Dose as directed every 3 months.      NALTREXONE HCL PO Take 4.5 mg by mouth every day. COMPOUNDED MEDICATION 4.5 MG, ONCE DAILY  [facility]      spironolactone (ALDACTONE) 100 MG Tab Take 100 mg by mouth every morning.      gabapentin (NEURONTIN) 100 MG Cap Take 400 mg by mouth every evening.  1    ibuprofen (MOTRIN) 200 MG Tab Take 200 mg by mouth every 8 hours as needed for Headache.      Multiple Vitamin (MULTIVITAMIN PO) Take 1 Tablet by mouth every day.      DHEA 25 MG Tab Take 1 Tablet by mouth every day.       No current [facility]-ordered facility-administered medications on file.       Past Medical History:   Diagnosis Date    Arthritis 2018    Osteo    Bowel habit changes     constipation    Breath shortness     25 years ago    Bronchitis 11/2016    Diabetes (HCC) 12/08/2023    Borderline per patient    Disorder of thyroid     Erythrocytosis     Fibromyalgia     Gynecological disorder     pre cancer-vulva    High cholesterol     Hypertension     well controlled on medication per pt    Pain     Back, neck and shoulders.    Psychiatric problem     anxiety, depression    Under care of pain management specialist     MD Meeks in [place]       Past Surgical History:   Procedure Laterality Date    BIOPSY, VULVA, VAGINA, OR PERINEUM  4/18/2024    Procedure: WIDE LOCAL EXCISION OF LEFT VULVA;  Surgeon: Scott Blanco M.D.;  Location: [facility];  Service: Gynecology Oncology    GA CYSTOURETHROSCOPY N/A 12/26/2023    Procedure: CYSTOSCOPY;  Surgeon: Misty Kapoor D.O.;  Location: [facility];  Service: Gyn Robotic    HYSTERECTOMY ROBOTIC XI N/A 12/26/2023    Procedure: ROBOTIC HYSTERECTOMY, BILATERAL SALPINGO-OOPHORECTOMY;  Surgeon: Misty Kapoor D.O.;  Location: [facility]  [place];  Service: Gyn Robotic    DILATION AND CURETTAGE  05/19/2022    Procedure: DILATION AND CURETTAGE-ENDOCERVICAL;  Surgeon: Scott Blanco M.D.;  Location: SURGERY [facility];  Service: Gynecology Oncology    BREAST BIOPSY Right 08/07/2019    Procedure: BIOPSY, BREAST - ASHLEY LOCALIZED;  Surgeon: Perla Mancilla M.D.;  Location: SURGERY SAME DAY [facility] [place];  Service: General    MARY BY LAPAROSCOPY N/A 05/18/2018    Procedure: MARY BY LAPAROSCOPY;  Surgeon: Perla Mancilla M.D.;  Location: SURGERY SAME DAY [facility] [place];  Service: General    PELVIC EXAM UNDER ANESTHESIA  06/01/2017    Procedure: PELVIC EXAM UNDER ANESTHESIA;  Surgeon: Jose Bruner M.D.;  Location: SURGERY [facility];  Service:     VULVECTOMY PARTIAL  06/01/2017    Procedure: VULVECTOMY PARTIAL/WIDE LOCAL EXCISION;  Surgeon: Jose Bruner M.D.;  Location: SURGERY [facility];  Service:     OTHER  2002    wisdom teeth removed    US-NEEDLE CORE BX-BREAST PANEL  1993    OTHER ABDOMINAL SURGERY  2019    Gall bladder removal       Family History   Problem Relation Age of Onset    Hypertension Mother     Hyperlipidemia Mother     Other Father     Hypertension Father     Psychiatric Illness Father         Depression/Anxiety    Hyperlipidemia Father     Stroke Father     Alcohol abuse Father        Social History     Tobacco Use   Smoking Status Never   Smokeless Tobacco Never       Social History     Substance and Sexual Activity   Alcohol Use Never    Comment: 1 drink yearly       Review of systems:  As per HPI above. All other systems reviewed and negative.      Past Medical, Social, and Family history reviewed and updated in EPIC     Objective     Lab Results   Component Value Date/Time    HBA1C 5.5 04/11/2024 08:41 AM    HBA1C 5.5 12/12/2023 09:19 AM    HBA1C 5.7 (H) 12/16/2022 10:27 AM       Lab Results   Component Value Date/Time    WBC 6.9 04/11/2024 08:41 AM    HEMOGLOBIN 15.9 04/11/2024 08:41 AM    HEMATOCRIT 47.4 (H)  "04/11/2024 08:41 AM    MCV 89.1 04/11/2024 08:41 AM    PLATELETCT 295 04/11/2024 08:41 AM         Lab Results   Component Value Date/Time    SODIUM 137 04/11/2024 08:41 AM    POTASSIUM 3.9 04/11/2024 08:41 AM    GLUCOSE 90 04/11/2024 08:41 AM    BUN 8 04/11/2024 08:41 AM    CREATININE 0.78 04/11/2024 08:41 AM       Lab Results   Component Value Date/Time    CHOLSTRLTOT 176 01/23/2024 07:14 AM    LDL 78 01/23/2024 07:14 AM    HDL 63 01/23/2024 07:14 AM    TRIGLYCERIDE 175 (H) 01/23/2024 07:14 AM       Lab Results   Component Value Date/Time    ALTSGPT 17 04/11/2024 08:41 AM       -------------------------------------------------------------------------------------------    /72 (BP Location: Right arm, Patient Position: Sitting, BP Cuff Size: Adult)   Pulse 61   Temp 36.1 °C (96.9 °F) (Temporal)   Ht 1.626 m (5' 4\")   Wt 96.1 kg (211 lb 12.8 oz)   SpO2 96%   Body mass index is 36.36 kg/m².    General: alert in no apparent distress.  Cardiovascular: regular rate and rhythm  Pulmonary: lungs : no wheezing   Gastrointestinal: BS present.   Cranial nerves II to XII grossly intact  Sinus exam no purulent drainage  Oropharynx no exudate    Assessment and Plan     1. Recurrent major depressive disorder, in full remission (HCC)  Chronic stable.  Continue Zoloft 200 Mg daily.  Continue follow-up with psychiatry service.  2. Generalized anxiety disorder  Chronic condition.  Continue propranolol 20 Mg 3 times a day as needed.  Continue follow-up with psychiatry service    3. Primary insomnia  Chronic stable.  The patient may take trazodone 150 Mg at bedtime as needed.    4. Hypothyroidism, unspecified type  - levothyroxine (SYNTHROID) 50 MCG Tab; Take 1 Tablet by mouth every morning on an empty stomach.  Dispense: 90 Tablet; Refill: 3  Chronic condition.  Current status unclear.  Lab test ordered to check TSH.  Continue levothyroxine 50 mcg daily    5. Dyslipidemia  - atorvastatin (LIPITOR) 20 MG Tab; Take 1 Tablet by " mouth every day.  Dispense: 90 Tablet; Refill: 3  Chronic.  Current status unclear.  Lipid panel requested.  Continue atorvastatin 20 Mg daily    6. Fibromyalgia  Chronic stable.  Continue gabapentin 40 mg at bedtime and naltrexone 4.5 Mg daily.  Continue follow-up with pain specialist as directed    7. Essential hypertension  - hydroCHLOROthiazide 25 MG Tab; Take 1 Tablet by mouth every day.  Dispense: 90 Tablet; Refill: 3  Chronic condition.  BP stable.  Continue hydrochlorothiazide 25 Mg daily.  Monitor blood pressure on regular basis at home    8. Well adult exam  - HEMOGLOBIN A1C; Future  - ALANINE AMINO-TRANS; Future  - Basic Metabolic Panel; Future  - CBC WITH DIFFERENTIAL; Future  - Lipid Profile; Future  - TSH; Future  - MICROALBUMIN CREAT RATIO URINE; Future  - VITAMIN D,25 HYDROXY (DEFICIENCY); Future    9. History of cancer of vulva  10. Status post hysterectomy  chronic condition.  Patient is status post hysterectomy and bilateral oophorectomy.  Patient currently asymptomatic.  Continue follow-up with GYN oncologist      11. Encounter for screening mammogram for malignant neoplasm of breast  - MA-SCREENING MAMMO BILAT W/TOMOSYNTHESIS W/CAD; Future    12. Dense breast  - US-SCREENING WHOLE BREAST BILATERAL (3D SCREENING); Future    13. Need for vaccination  - Tdap Vaccine =>8YO IM                  ANTICIPATORY GUIDANCE  Patient Counseling:  -Cholesterol screening. Fasting lipid panel  -Diabetes screening. Fasting blood sugar  -Diet: advised lean meats, fruits, vegetables, whole grains  -Discussed Healthful lifestyle measures. Pt to avoid tobacco, ETOH, and drug use.  -Pt to continue with regular exercise/walking activities  -Advised sun protection, sunscreen use  -Injury prevention: discussed safety seat belts  -Discussed preventive immunizations  -Recommend patient to schedule a yearly eye examination and dental exam                       Please note that this dictation was created using voice  recognition software. I have made every reasonable attempt to correct obvious errors, but I expect that there are errors of grammar and possibly content that I did not discover before finalizing the note.    Parker Newton MD  Internal Medicine  [facility]

## 2025-01-16 PROBLEM — R73.03 PREDIABETES: Status: ACTIVE | Noted: 2025-01-16

## 2025-01-16 PROBLEM — R73.03 PREDIABETES: Chronic | Status: ACTIVE | Noted: 2025-01-16

## 2025-01-22 ENCOUNTER — HOSPITAL ENCOUNTER (OUTPATIENT)
Dept: RADIOLOGY | Facility: MEDICAL CENTER | Age: 50
End: 2025-01-22
Attending: PHYSICIAN ASSISTANT
Payer: COMMERCIAL

## 2025-01-22 DIAGNOSIS — N80.03 ADENOMYOSIS OF UTERUS: ICD-10-CM

## 2025-01-22 DIAGNOSIS — R87.619 UNSPECIFIED ABNORMAL CYTOLOGICAL FINDINGS IN SPECIMENS FROM CERVIX UTERI: ICD-10-CM

## 2025-01-22 DIAGNOSIS — Z13.820 SCREENING FOR OSTEOPOROSIS: ICD-10-CM

## 2025-01-22 DIAGNOSIS — G89.18 POSTOPERATIVE PAIN: ICD-10-CM

## 2025-01-22 DIAGNOSIS — N90.89 OTHER SPECIFIED NONINFLAMMATORY DISORDERS OF VULVA AND PERINEUM: ICD-10-CM

## 2025-01-22 DIAGNOSIS — D07.1: ICD-10-CM

## 2025-01-22 DIAGNOSIS — G89.18 PAIN, ACUTE POSTOPERATIVE: ICD-10-CM

## 2025-01-22 DIAGNOSIS — E66.9 OBESITY (BMI 30-39.9): ICD-10-CM

## 2025-01-22 DIAGNOSIS — I10 ESSENTIAL HYPERTENSION, BENIGN: ICD-10-CM

## 2025-01-22 DIAGNOSIS — D25.9 LEIOMYOMA OF BODY OF UTERUS: ICD-10-CM

## 2025-01-22 PROCEDURE — 77080 DXA BONE DENSITY AXIAL: CPT

## 2025-01-29 ENCOUNTER — OFFICE VISIT (OUTPATIENT)
Dept: BEHAVIORAL HEALTH | Facility: CLINIC | Age: 50
End: 2025-01-29
Payer: COMMERCIAL

## 2025-01-29 DIAGNOSIS — F33.42 RECURRENT MAJOR DEPRESSIVE DISORDER, IN FULL REMISSION (HCC): ICD-10-CM

## 2025-01-29 DIAGNOSIS — F41.1 GENERALIZED ANXIETY DISORDER: Chronic | ICD-10-CM

## 2025-01-29 DIAGNOSIS — F41.0 PANIC ATTACKS: ICD-10-CM

## 2025-01-29 DIAGNOSIS — F42.2 MIXED OBSESSIONAL THOUGHTS AND ACTS: ICD-10-CM

## 2025-01-29 RX ORDER — TRAZODONE HYDROCHLORIDE 150 MG/1
TABLET ORAL
Qty: 90 TABLET | Refills: 3 | Status: SHIPPED | OUTPATIENT
Start: 2025-01-29

## 2025-01-29 RX ORDER — SERTRALINE HYDROCHLORIDE 100 MG/1
250 TABLET, FILM COATED ORAL DAILY
Qty: 225 TABLET | Refills: 3 | Status: SHIPPED | OUTPATIENT
Start: 2025-01-29 | End: 2025-04-29

## 2025-01-29 RX ORDER — PROPRANOLOL HCL 20 MG
20 TABLET ORAL 3 TIMES DAILY PRN
Qty: 270 TABLET | Refills: 1 | Status: SHIPPED | OUTPATIENT
Start: 2025-01-29

## 2025-01-29 NOTE — PROGRESS NOTES
PSYCHIATRY FOLLOW-UP NOTE      Name: Yomaira Call  MRN: [id number]  : 1975  Age: 49 y.o.  Date of assessment: 2025  PCP: Parker Newton M.D.  Persons in attendance: Patient      REASON FOR VISIT/CHIEF COMPLAINT (as stated by Patient):  Yomaira Call is a 49 y.o.,  or   White female, attending follow-up appointment for mood and anxiety management.      HISTORY OF PRESENT ILLNESS:  Yomaira Call is a 49 y.o. old female with MDD and YINKA comes in today for follow up. Patient was last seen 4 months ago, and following treatment planning recommendations were done:  Continue Zoloft 250 mg daily for mood and anxiety management.     Continue Inderal 20 mg BID PRN for anxiety management.   Continue Trazodone 150 mg HS PRN for sleep.  Currently not in psychotherapy for mood & anxiety.    History of Present Illness    She reports a decrease in her anxiety and depression symptoms, attributing this improvement to the higher dose of sertraline. She expresses a preference for increasing the dosage of sertraline t 250 mg, which has now been approved by her insurance.   She denies serotonin syndrome at 250 mg dose of Zoloft.  She has reduced her trazodone dosage from 150 mg to 75 mg, supplementing it with melatonin. Occasionally, approximately once every 10 days, she increases the trazodone dosage to 150 mg.   She has not experienced any episodes of falling or dizziness at night time now   She continues to take propranolol twice daily, once in the morning and once in the afternoon.   Her current gabapentin regimen includes 100 mg dose in the morning and 300 mg at night.   She identifies her parents as a source of stress, as she is responsible for their care. She also mentions work-related stress, particularly due to uncertainties regarding [employer] funding. She travels approximately every 7 weeks to visit her mother in [place]. She finds solace in conversations with her best friend  and is working on increasing her physical activity.         CURRENT MEDICATIONS:  Current Outpatient Medications   Medication Sig Dispense Refill    atorvastatin (LIPITOR) 20 MG Tab Take 1 Tablet by mouth every day. 90 Tablet 3    hydroCHLOROthiazide 25 MG Tab Take 1 Tablet by mouth every day. 90 Tablet 3    levothyroxine (SYNTHROID) 50 MCG Tab Take 1 Tablet by mouth every morning on an empty stomach. 90 Tablet 3    sertraline (ZOLOFT) 100 MG Tab Take 200 mg by mouth every day.      propranolol (INDERAL) 20 MG Tab Take 1 Tablet by mouth 3 times a day as needed (Anxiety). 270 Tablet 1    traZODone (DESYREL) 150 MG Tab Take 1 Tablet by mouth every evening. 90 Tablet 1    ibuprofen (MOTRIN) 200 MG Tab Take 200 mg by mouth every 8 hours as needed for Headache.      Triamcinolone Acetonide (KENALOG INJ) Inject 1 Dose as directed every 3 months.      NALTREXONE HCL PO Take 4.5 mg by mouth every day. COMPOUNDED MEDICATION 4.5 MG, ONCE DAILY  [facility]      Multiple Vitamin (MULTIVITAMIN PO) Take 1 Tablet by mouth every day.      DHEA 25 MG Tab Take 1 Tablet by mouth every day.      spironolactone (ALDACTONE) 100 MG Tab Take 100 mg by mouth every morning.      gabapentin (NEURONTIN) 100 MG Cap Take 400 mg by mouth every evening.  1     No current facility-administered medications for this visit.       MEDICAL HISTORY  Past Medical History:   Diagnosis Date    Arthritis 2018    Osteo    Bowel habit changes     constipation    Breath shortness     25 years ago    Bronchitis 11/2016    Diabetes (HCC) 12/08/2023    Borderline per patient    Disorder of thyroid     Erythrocytosis     Fibromyalgia     Gynecological disorder     pre cancer-vulva    High cholesterol     Hypertension     well controlled on medication per pt    Pain     Back, neck and shoulders.    Psychiatric problem     anxiety, depression    Under care of pain management specialist     MD Meeks in [place]     Past Surgical History:   Procedure  Laterality Date    BIOPSY, VULVA, VAGINA, OR PERINEUM  4/18/2024    Procedure: WIDE LOCAL EXCISION OF LEFT VULVA;  Surgeon: Scott Blanco M.D.;  Location: [facility];  Service: Gynecology Oncology    [place] CYSTOURETHROSCOPY N/A 12/26/2023    Procedure: CYSTOSCOPY;  Surgeon: Misty Kapoor D.O.;  Location: [facility];  Service: Gyn Robotic    HYSTERECTOMY ROBOTIC XI N/A 12/26/2023    Procedure: ROBOTIC HYSTERECTOMY, BILATERAL SALPINGO-OOPHORECTOMY;  Surgeon: Misty Kapoor D.O.;  Location: [facility];  Service: Gyn Robotic    DILATION AND CURETTAGE  05/19/2022    Procedure: DILATION AND CURETTAGE-ENDOCERVICAL;  Surgeon: Scott Blanco M.D.;  Location: [facility];  Service: Gynecology Oncology    BREAST BIOPSY Right 08/07/2019    Procedure: BIOPSY, BREAST - ASHLEY LOCALIZED;  Surgeon: Perla Mancilla M.D.;  Location: SURGERY SAME DAY [facility];  Service: General    MARY BY LAPAROSCOPY N/A 05/18/2018    Procedure: MARY BY LAPAROSCOPY;  Surgeon: Perla Mancilla M.D.;  Location: SURGERY SAME DAY [facility];  Service: General    PELVIC EXAM UNDER ANESTHESIA  06/01/2017    Procedure: PELVIC EXAM UNDER ANESTHESIA;  Surgeon: Jose Bruner M.D.;  Location: [facility];  Service:     VULVECTOMY PARTIAL  06/01/2017    Procedure: VULVECTOMY PARTIAL/WIDE LOCAL EXCISION;  Surgeon: Jose Bruner M.D.;  Location: [facility];  Service:     OTHER  2002    wisdom teeth removed    US-NEEDLE CORE BX-BREAST PANEL  1993    OTHER ABDOMINAL SURGERY  2019    Gall bladder removal         PAST PSYCHIATRIC MEDICATIONS  Prozac  Lexapro 20 mg daily for 3 years: Initially helpful but stopped working afterwards  Zoloft   Effexor for pain management (was not depressed during this trial)  Wellbutrin (for pain management per patient & not for depression)  Buspirone: GI side effects  Xanax 0.25 mg PRN    REVIEW OF SYSTEMS:        Constitutional negative   Eyes negative    Ears/Nose/Mouth/Throat negative   Cardiovascular negative   Respiratory negative   Gastrointestinal negative   Genitourinary negative   Muscular negative   Integumentary negative   Neurological negative   Endocrine negative   Hematologic/Lymphatic negative     PHYSICAL EXAMINAION:  Vital signs: LMP 2021 (Approximate)   Musculoskeletal: Normal gait.   Abnormal movements: none      MENTAL STATUS EXAMINATION      General:   - Grooming and hygiene: Casual,   - Apparent distress: none,   - Behavior: Calm  - Eye Contact:  Good,   - no psychomotor agitation or retardation    - Participation: Active verbal participation  Orientation: Alert and Fully Oriented to person, place and time  Mood: Euthymic  Affect: Flexible and Full range,  Thought Process: Logical and Goal-directed  Thought Content: Denies suicidal or homicidal ideations, intent or plan   Perception: Denies auditory or visual hallucinations. No delusions noted   Attention span and concentration: Intact   Speech:Rate within normal limits and Volume within normal limits  Language: Appropriate   Insight: Good  Judgment: Good  Recent and remote memory: No gross evidence of memory deficits        DEPRESSION SCREENIN/10/2024     8:00 AM 7/10/2024     8:30 AM 1/15/2025     8:00 AM   Depression Screen (PHQ-2/PHQ-9)   PHQ-2 Total Score 2 4 0   PHQ-9 Total Score 5 14        Interpretation of PHQ-9 Total Score   Score Severity   1-4 No Depression   5-9 Mild Depression   10-14 Moderate Depression   15-19 Moderately Severe Depression   20-27 Severe Depression    CURRENT RISK:       Suicidal: Low       Homicidal: Low       Self-Harm: Low       Relapse: Low       Crisis Safety Plan Reviewed Not Indicated       If evidence of imminent risk is present, intervention/plan:      MEDICAL RECORDS/LABS/DIAGNOSTIC TESTS REVIEWED:  No new lab since last visit     [place]  records -   Reviewed     PLAN:  (1) MDD, (2) YINKA, (3) OCD   improving  Continue Zoloft 250 mg daily  for mood and anxiety management.     Continue Inderal 20 mg BID PRN for anxiety management.   Continue Trazodone  mg HS PRN for sleep.  Currently not in psychotherapy for mood & anxiety.  Medication options, alternatives (including no medications) and medication risks/benefits/side effects were discussed in detail.  Explained importance of contraceptive measures while on psychotropic medications, educated to let provider know if ever pregnant or wanting to become pregnant. Verbalized understanding.  The patient was advised to call, message provider on MyChart, or come in to the clinic if symptoms worsen or if any future questions/issues regarding their medications arise; the patient verbalized understanding and agreement.    The patient was educated to call 911, call the suicide hotline, or go to local ER if having thoughts of suicide or homicide; verbalized understanding.      Billing Coding based on:  59577 based on MDM    Return to clinic in 3-4 months or sooner if symptoms worsen.  Next Appointment: instruction provided on how to make the next appointment.     The proposed treatment plan was discussed with the patient who was provided the opportunity to ask questions and make suggestions regarding alternative treatment. Patient verbalized understanding and expressed agreement with the plan.       Armando Choi M.D.  01/29/25    This note was created using voice recognition software (Dragon). The accuracy of the dictation is limited by the abilities of the software. I have reviewed the note prior to signing, however some errors in grammar and context are still possible. If you have any questions related to this note please do not hesitate to contact our office.

## 2025-02-03 ENCOUNTER — TELEPHONE (OUTPATIENT)
Dept: BEHAVIORAL HEALTH | Facility: CLINIC | Age: 50
End: 2025-02-03
Payer: COMMERCIAL

## 2025-02-04 NOTE — TELEPHONE ENCOUNTER
FINAL PRIOR AUTHORIZATION STATUS:    1.  Name of Medication & Dose: Sertraline 100mg Tablets    Quantity of 225 / 90 Day Supply     2. Prior Auth Status: Approved through Oct.2025 at ONLY  [facility]/ [facility] are eligible for 90 day supply pick ups     3. Action Taken: Patient Notified: yes

## 2025-02-05 DIAGNOSIS — F33.42 RECURRENT MAJOR DEPRESSIVE DISORDER, IN FULL REMISSION (HCC): ICD-10-CM

## 2025-02-05 DIAGNOSIS — F41.1 GENERALIZED ANXIETY DISORDER: Chronic | ICD-10-CM

## 2025-02-05 DIAGNOSIS — F41.0 PANIC ATTACKS: ICD-10-CM

## 2025-02-05 DIAGNOSIS — F42.2 MIXED OBSESSIONAL THOUGHTS AND ACTS: ICD-10-CM

## 2025-02-05 RX ORDER — PROPRANOLOL HCL 20 MG
20 TABLET ORAL 3 TIMES DAILY PRN
Qty: 270 TABLET | Refills: 0 | Status: SHIPPED | OUTPATIENT
Start: 2025-02-05 | End: 2025-05-06

## 2025-02-05 RX ORDER — SERTRALINE HYDROCHLORIDE 100 MG/1
250 TABLET, FILM COATED ORAL DAILY
Qty: 225 TABLET | Refills: 0 | Status: SHIPPED | OUTPATIENT
Start: 2025-02-05 | End: 2025-05-06

## 2025-02-05 NOTE — TELEPHONE ENCOUNTER
pts ins. is req. we send over 90 day supply RX's and to selected pharmacies only/ previous [facility] pharmacy is not contracted so pt is now req we resend to updated [facility] pharmacy can you resend thank you!          Received request via: Pharmacy    Was the patient seen in the last year in this department? Yes    Does the patient have an active prescription (recently filled or refills available) for medication(s) requested? No    Pharmacy Name: [facility]    Does the patient have care home Plus and need 100-day supply? (This applies to ALL medications) Patient does not have SCP

## 2025-03-17 ENCOUNTER — HOSPITAL ENCOUNTER (OUTPATIENT)
Dept: RADIOLOGY | Facility: MEDICAL CENTER | Age: 50
End: 2025-03-17
Attending: INTERNAL MEDICINE
Payer: COMMERCIAL

## 2025-03-17 DIAGNOSIS — Z12.31 ENCOUNTER FOR SCREENING MAMMOGRAM FOR MALIGNANT NEOPLASM OF BREAST: ICD-10-CM

## 2025-03-17 DIAGNOSIS — R92.30 DENSE BREAST: ICD-10-CM

## 2025-03-17 PROCEDURE — 76641 ULTRASOUND BREAST COMPLETE: CPT

## 2025-03-17 PROCEDURE — 77067 SCR MAMMO BI INCL CAD: CPT

## 2025-03-19 ENCOUNTER — RESULTS FOLLOW-UP (OUTPATIENT)
Dept: MEDICAL GROUP | Facility: PHYSICIAN GROUP | Age: 50
End: 2025-03-19
Payer: COMMERCIAL

## 2025-03-20 ENCOUNTER — RESULTS FOLLOW-UP (OUTPATIENT)
Dept: MEDICAL GROUP | Facility: PHYSICIAN GROUP | Age: 50
End: 2025-03-20
Payer: COMMERCIAL

## 2025-05-04 DIAGNOSIS — F41.0 PANIC ATTACKS: ICD-10-CM

## 2025-05-04 DIAGNOSIS — F41.1 GENERALIZED ANXIETY DISORDER: Chronic | ICD-10-CM

## 2025-05-04 DIAGNOSIS — F42.2 MIXED OBSESSIONAL THOUGHTS AND ACTS: ICD-10-CM

## 2025-05-04 DIAGNOSIS — F33.42 RECURRENT MAJOR DEPRESSIVE DISORDER, IN FULL REMISSION (HCC): ICD-10-CM

## 2025-05-05 RX ORDER — SERTRALINE HYDROCHLORIDE 100 MG/1
250 TABLET, FILM COATED ORAL DAILY
Qty: 75 TABLET | Refills: 0 | Status: SHIPPED | OUTPATIENT
Start: 2025-05-05 | End: 2025-05-13 | Stop reason: SDUPTHER

## 2025-05-05 RX ORDER — PROPRANOLOL HCL 20 MG
20 TABLET ORAL 3 TIMES DAILY PRN
Qty: 90 TABLET | Refills: 0 | Status: SHIPPED | OUTPATIENT
Start: 2025-05-05 | End: 2025-05-13 | Stop reason: SDUPTHER

## 2025-05-13 ENCOUNTER — OFFICE VISIT (OUTPATIENT)
Dept: BEHAVIORAL HEALTH | Facility: CLINIC | Age: 50
End: 2025-05-13
Payer: COMMERCIAL

## 2025-05-13 DIAGNOSIS — F41.0 PANIC ATTACKS: ICD-10-CM

## 2025-05-13 DIAGNOSIS — F41.1 GENERALIZED ANXIETY DISORDER: Chronic | ICD-10-CM

## 2025-05-13 DIAGNOSIS — F42.2 MIXED OBSESSIONAL THOUGHTS AND ACTS: ICD-10-CM

## 2025-05-13 DIAGNOSIS — F33.42 RECURRENT MAJOR DEPRESSIVE DISORDER, IN FULL REMISSION (HCC): ICD-10-CM

## 2025-05-13 PROCEDURE — 99214 OFFICE O/P EST MOD 30 MIN: CPT | Performed by: PSYCHIATRY & NEUROLOGY

## 2025-05-13 PROCEDURE — 90833 PSYTX W PT W E/M 30 MIN: CPT | Performed by: PSYCHIATRY & NEUROLOGY

## 2025-05-13 RX ORDER — SERTRALINE HYDROCHLORIDE 100 MG/1
200 TABLET, FILM COATED ORAL DAILY
Qty: 180 TABLET | Refills: 1 | Status: SHIPPED | OUTPATIENT
Start: 2025-05-13 | End: 2025-05-13

## 2025-05-13 RX ORDER — PROPRANOLOL HCL 20 MG
20 TABLET ORAL 3 TIMES DAILY PRN
Qty: 270 TABLET | Refills: 2 | Status: SHIPPED | OUTPATIENT
Start: 2025-05-13 | End: 2025-05-13

## 2025-05-13 RX ORDER — TRAZODONE HYDROCHLORIDE 150 MG/1
TABLET ORAL
Qty: 90 TABLET | Refills: 3 | Status: SHIPPED | OUTPATIENT
Start: 2025-05-13

## 2025-05-13 RX ORDER — PROPRANOLOL HCL 20 MG
20 TABLET ORAL 3 TIMES DAILY PRN
Qty: 270 TABLET | Refills: 2 | Status: SHIPPED | OUTPATIENT
Start: 2025-05-13

## 2025-05-13 RX ORDER — TRAZODONE HYDROCHLORIDE 150 MG/1
TABLET ORAL
Qty: 90 TABLET | Refills: 3 | Status: SHIPPED | OUTPATIENT
Start: 2025-05-13 | End: 2025-05-13

## 2025-05-13 RX ORDER — SERTRALINE HYDROCHLORIDE 100 MG/1
200 TABLET, FILM COATED ORAL DAILY
Qty: 180 TABLET | Refills: 1 | Status: SHIPPED | OUTPATIENT
Start: 2025-05-13

## 2025-05-13 NOTE — PROGRESS NOTES
"    PSYCHIATRY FOLLOW-UP NOTE      Name: Yomaira Call  MRN: [id number]  : 1975  Age: 49 y.o.  Date of assessment: 2025  PCP: Parker Newton M.D.  Persons in attendance: Patient      REASON FOR VISIT/CHIEF COMPLAINT (as stated by Patient):  Yomaira Call is a 49 y.o.,  or   White female, attending follow-up appointment for mood and anxiety management.      HISTORY OF PRESENT ILLNESS:  Yomaira Call is a 49 y.o. old female with MDD, YINKA and OCD comes in today for follow up. Patient was last seen 3 months ago, and following treatment planning recommendations were done:  Continue Zoloft 250 mg daily for mood and anxiety management.     Continue Inderal 20 mg BID PRN for anxiety management.   Continue Trazodone  mg HS PRN for sleep.  Currently not in psychotherapy for mood & anxiety.    History of Present Illness    She reports an increase in hot flashes and jitteriness, which she describes as \"kind of jittery\" and is unsure if it is due to her anxiety or another cause. She is not experiencing any gastrointestinal symptoms such as abdominal pain, diarrhea, or nausea. She also does not report any visual disturbances or an increase in headache frequency.   She has not introduced any new medications or supplements into her regimen, apart from vitamins and occasional melatonin.   She expresses satisfaction with her current sleep management and is hesitant to make any changes to it, stating, \"I hate to mess with the sleep because it took so long to get it under control.\"   She believes her coping mechanisms are adequate, although she occasionally feels overwhelmed.   She is not currently engaged in any therapy.   She has been on a regimen of Zoloft 250 mg, which she perceives as not significantly more beneficial than the previous dose of 200 mg. Her current medication includes trazodone 75 mg, which she takes by halving a 150 mg tablet. She also takes propranolol 40 mg " twice daily.  Agreed with reducing Zoloft to 200 mg and keeping trazodone at 75 mg with occasional use of 150 mg as needed for sleep.      PSYCHOTHERAPY ASPECT OF SESSION (16 MIN):  Session was dedicated to letting patient express her feelings related to changes happening in life.  Psychoeducation provided from cognitive behavioral therapy standpoint and educated patient on identifying thought patterns in terms of meanings assigned to a specific situation, person or event and how that translates into feelings.  Discussed various interventions that can be done to implement the changes to enhance empowerment rather than feeling helpless.  Later part of the session was dedicated to psychoeducation and active listening.      CURRENT MEDICATIONS:  Current Outpatient Medications   Medication Sig Dispense Refill    sertraline (ZOLOFT) 100 MG Tab Take 2.5 Tablets by mouth every day. 75 Tablet 0    propranolol (INDERAL) 20 MG Tab Take 1 Tablet by mouth 3 times a day as needed (anxiety). 90 Tablet 0    traZODone (DESYREL) 150 MG Tab Take half to one tab at bedtime as needed for sleep 90 Tablet 3    atorvastatin (LIPITOR) 20 MG Tab Take 1 Tablet by mouth every day. 90 Tablet 3    hydroCHLOROthiazide 25 MG Tab Take 1 Tablet by mouth every day. 90 Tablet 3    levothyroxine (SYNTHROID) 50 MCG Tab Take 1 Tablet by mouth every morning on an empty stomach. 90 Tablet 3    ibuprofen (MOTRIN) 200 MG Tab Take 200 mg by mouth every 8 hours as needed for Headache.      Triamcinolone Acetonide (KENALOG INJ) Inject 1 Dose as directed every 3 months.      NALTREXONE HCL PO Take 4.5 mg by mouth every day. COMPOUNDED MEDICATION 4.5 MG, ONCE DAILY  [facility]      Multiple Vitamin (MULTIVITAMIN PO) Take 1 Tablet by mouth every day.      DHEA 25 MG Tab Take 1 Tablet by mouth every day.      spironolactone (ALDACTONE) 100 MG Tab Take 100 mg by mouth every morning.      gabapentin (NEURONTIN) 100 MG Cap Take 400 mg by mouth every  evening.  1     No current facility-administered medications for this visit.       MEDICAL HISTORY  Past Medical History:   Diagnosis Date    Arthritis 2018    Osteo    Bowel habit changes     constipation    Breath shortness     25 years ago    Bronchitis 11/2016    Diabetes (HCC) 12/08/2023    Borderline per patient    Disorder of thyroid     Erythrocytosis     Fibromyalgia     Gynecological disorder     pre cancer-vulva    High cholesterol     Hypertension     well controlled on medication per pt    Pain     Back, neck and shoulders.    Psychiatric problem     anxiety, depression    Under care of pain management specialist     MD Meeks in [place]     Past Surgical History:   Procedure Laterality Date    BIOPSY, VULVA, VAGINA, OR PERINEUM  4/18/2024    Procedure: WIDE LOCAL EXCISION OF LEFT VULVA;  Surgeon: Scott Blanco M.D.;  Location: [facility];  Service: Gynecology Oncology    PA CYSTOURETHROSCOPY N/A 12/26/2023    Procedure: CYSTOSCOPY;  Surgeon: Misty Kapoor D.O.;  Location: [facility];  Service: Gyn Robotic    HYSTERECTOMY ROBOTIC XI N/A 12/26/2023    Procedure: ROBOTIC HYSTERECTOMY, BILATERAL SALPINGO-OOPHORECTOMY;  Surgeon: Misty Kapoor [id number];  Location: [facility];  Service: Gyn Robotic    DILATION AND CURETTAGE  05/19/2022    Procedure: DILATION AND CURETTAGE-ENDOCERVICAL;  Surgeon: Scott Blanco M.D.;  Location: [facility];  Service: Gynecology Oncology    BREAST BIOPSY Right 08/07/2019    Procedure: BIOPSY, BREAST - ASHLEY LOCALIZED;  Surgeon: Perla Mancilla M.D.;  Location: SURGERY SAME DAY [facility];  Service: General    MARY BY LAPAROSCOPY N/A 05/18/2018    Procedure: MARY BY LAPAROSCOPY;  Surgeon: Perla Mancilla M.D.;  Location: SURGERY SAME DAY [facility];  Service: General    PELVIC EXAM UNDER ANESTHESIA  06/01/2017    Procedure: PELVIC EXAM UNDER ANESTHESIA;  Surgeon: Jose Bruner M.D.;  Location: [facility];   Service:     VULVECTOMY PARTIAL  2017    Procedure: VULVECTOMY PARTIAL/WIDE LOCAL EXCISION;  Surgeon: Jose Bruner M.D.;  Location: SURGERY [facility];  Service:     OTHER      wisdom teeth removed    US-NEEDLE CORE BX-BREAST PANEL      OTHER ABDOMINAL SURGERY  2019    Gall bladder removal       PAST PSYCHIATRIC MEDICATIONS  Prozac  Lexapro 20 mg daily for 3 years: Initially helpful but stopped working afterwards  Zoloft   Effexor for pain management (was not depressed during this trial)  Wellbutrin (for pain management per patient & not for depression)  Buspirone: GI side effects  Xanax 0.25 mg PRN    REVIEW OF SYSTEMS:        Constitutional negative   Eyes negative   Ears/Nose/Mouth/Throat negative   Cardiovascular negative   Respiratory negative   Gastrointestinal negative   Genitourinary negative   Muscular negative   Integumentary negative   Neurological negative   Endocrine negative   Hematologic/Lymphatic negative     PHYSICAL EXAMINAION:  Vital signs: LMP 2021 (Approximate)   Musculoskeletal: Normal gait.   Abnormal movements: none      MENTAL STATUS EXAMINATION      General:   - Grooming and hygiene: Casual,   - Apparent distress: none,   - Behavior: Calm  - Eye Contact:  Good,   - no psychomotor agitation or retardation    - Participation: Active verbal participation  Orientation: Alert and Fully Oriented to person, place and time  Mood: Euthymic  Affect: Flexible and Full range,  Thought Process: Logical and Goal-directed  Thought Content: Denies suicidal or homicidal ideations, intent or plan   Perception: Denies auditory or visual hallucinations. No delusions noted   Attention span and concentration: Intact   Speech:Rate within normal limits and Volume within normal limits  Language: Appropriate   Insight: Good  Judgment: Good  Recent and remote memory: No gross evidence of memory deficits        DEPRESSION SCREENIN/10/2024     8:00 AM 7/10/2024     8:30 AM  1/15/2025     8:00 AM   Depression Screen (PHQ-2/PHQ-9)   PHQ-2 Total Score 2 4 0   PHQ-9 Total Score 5 14        Interpretation of PHQ-9 Total Score   Score Severity   1-4 No Depression   5-9 Mild Depression   10-14 Moderate Depression   15-19 Moderately Severe Depression   20-27 Severe Depression    CURRENT RISK:       Suicidal: Low       Homicidal: Low       Self-Harm: Low       Relapse: Low       Crisis Safety Plan Reviewed Not Indicated       If evidence of imminent risk is present, intervention/plan:      MEDICAL RECORDS/LABS/DIAGNOSTIC TESTS REVIEWED:  No new lab since last visit     [place]  records -   Reviewed     PLAN:  (1) MDD, (2) YINKA, (3) OCD   improving  Reduce Zoloft 200 mg daily for mood and anxiety management.     Continue Inderal 20 mg BID PRN for anxiety management.   Continue Trazodone  mg HS PRN for sleep.  Currently not in psychotherapy for mood & anxiety.  Medication options, alternatives (including no medications) and medication risks/benefits/side effects were discussed in detail.  Explained importance of contraceptive measures while on psychotropic medications, educated to let provider know if ever pregnant or wanting to become pregnant. Verbalized understanding.  The patient was advised to call, message provider on [employer], or come in to the clinic if symptoms worsen or if any future questions/issues regarding their medications arise; the patient verbalized understanding and agreement.    The patient was educated to call 911, call the suicide hotline, or go to local ER if having thoughts of suicide or homicide; verbalized understanding.      Billing Coding based on:  25131 based on [facility]  [id number]: based on psychotherapy timing    Return to clinic in 3 months or sooner if symptoms worsen.  Next Appointment: instruction provided on how to make the next appointment.     The proposed treatment plan was discussed with the patient who was provided the opportunity to ask questions and make  suggestions regarding alternative treatment. Patient verbalized understanding and expressed agreement with the plan.       Armando Choi M.D.  05/13/25    This note was created using voice recognition software (Dragon). The accuracy of the dictation is limited by the abilities of the software. I have reviewed the note prior to signing, however some errors in grammar and context are still possible. If you have any questions related to this note please do not hesitate to contact our office.

## 2025-06-02 DIAGNOSIS — F42.2 MIXED OBSESSIONAL THOUGHTS AND ACTS: ICD-10-CM

## 2025-06-02 DIAGNOSIS — F41.1 GENERALIZED ANXIETY DISORDER: Chronic | ICD-10-CM

## 2025-06-02 DIAGNOSIS — F33.42 RECURRENT MAJOR DEPRESSIVE DISORDER, IN FULL REMISSION (HCC): ICD-10-CM

## 2025-06-02 DIAGNOSIS — F41.0 PANIC ATTACKS: ICD-10-CM

## 2025-06-03 RX ORDER — PROPRANOLOL HCL 20 MG
20 TABLET ORAL 3 TIMES DAILY PRN
Qty: 270 TABLET | Refills: 2 | Status: SHIPPED | OUTPATIENT
Start: 2025-06-03

## 2025-06-03 RX ORDER — SERTRALINE HYDROCHLORIDE 100 MG/1
250 TABLET, FILM COATED ORAL DAILY
Qty: 225 TABLET | Refills: 3 | Status: SHIPPED | OUTPATIENT
Start: 2025-06-03 | End: 2025-09-01

## 2025-08-13 ENCOUNTER — OFFICE VISIT (OUTPATIENT)
Dept: BEHAVIORAL HEALTH | Facility: CLINIC | Age: 50
End: 2025-08-13
Payer: COMMERCIAL

## 2025-08-13 DIAGNOSIS — F42.2 MIXED OBSESSIONAL THOUGHTS AND ACTS: ICD-10-CM

## 2025-08-13 DIAGNOSIS — F33.42 RECURRENT MAJOR DEPRESSIVE DISORDER, IN FULL REMISSION (HCC): ICD-10-CM

## 2025-08-13 DIAGNOSIS — F51.01 PRIMARY INSOMNIA: Primary | Chronic | ICD-10-CM

## 2025-08-13 DIAGNOSIS — F41.1 GENERALIZED ANXIETY DISORDER: Chronic | ICD-10-CM

## 2025-08-13 DIAGNOSIS — F41.0 PANIC ATTACKS: ICD-10-CM

## 2025-08-13 PROCEDURE — 99214 OFFICE O/P EST MOD 30 MIN: CPT | Performed by: PSYCHIATRY & NEUROLOGY

## 2025-08-13 PROCEDURE — 90833 PSYTX W PT W E/M 30 MIN: CPT | Performed by: PSYCHIATRY & NEUROLOGY

## 2025-08-13 RX ORDER — ALPRAZOLAM 0.25 MG
0.25 TABLET ORAL
Qty: 10 TABLET | Refills: 0 | Status: SHIPPED | OUTPATIENT
Start: 2025-08-13 | End: 2025-08-23

## 2025-08-13 RX ORDER — TRAZODONE HYDROCHLORIDE 100 MG/1
100 TABLET ORAL NIGHTLY
Qty: 90 TABLET | Refills: 3 | Status: SHIPPED | OUTPATIENT
Start: 2025-08-13

## 2025-08-13 RX ORDER — SERTRALINE HYDROCHLORIDE 100 MG/1
200 TABLET, FILM COATED ORAL DAILY
Qty: 180 TABLET | Refills: 3 | Status: SHIPPED | OUTPATIENT
Start: 2025-08-13 | End: 2025-11-11